# Patient Record
Sex: FEMALE | Race: WHITE
[De-identification: names, ages, dates, MRNs, and addresses within clinical notes are randomized per-mention and may not be internally consistent; named-entity substitution may affect disease eponyms.]

---

## 2022-05-27 ENCOUNTER — HOSPITAL ENCOUNTER (EMERGENCY)
Dept: HOSPITAL 97 - ER | Age: 22
Discharge: HOME | End: 2022-05-27
Payer: SELF-PAY

## 2022-05-27 VITALS — SYSTOLIC BLOOD PRESSURE: 102 MMHG | DIASTOLIC BLOOD PRESSURE: 62 MMHG | OXYGEN SATURATION: 100 %

## 2022-05-27 VITALS — TEMPERATURE: 98.2 F

## 2022-05-27 DIAGNOSIS — Z32.02: ICD-10-CM

## 2022-05-27 DIAGNOSIS — Z88.0: ICD-10-CM

## 2022-05-27 DIAGNOSIS — L02.414: Primary | ICD-10-CM

## 2022-05-27 LAB — SP GR UR: 1.02 (ref 1–1.03)

## 2022-05-27 PROCEDURE — 81003 URINALYSIS AUTO W/O SCOPE: CPT

## 2022-05-27 PROCEDURE — 99283 EMERGENCY DEPT VISIT LOW MDM: CPT

## 2022-05-27 PROCEDURE — 81025 URINE PREGNANCY TEST: CPT

## 2022-05-27 PROCEDURE — 0H9EXZZ DRAINAGE OF LEFT LOWER ARM SKIN, EXTERNAL APPROACH: ICD-10-PCS

## 2022-05-27 NOTE — XMS REPORT
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









             COVID 19 Asymptomatic IH AG NEGATIVE     NEGATIVE                  

Negative results should be 

treated



             (test code = COVNONPUIAG)                                        as

 presumptive and ifinconsistent



                                                                 with clinical s

igns and symptoms, or



                                                                 necessaryfor pa

tient management,



                                                                 should be teste

d with an



                                                                 alternativemole

cular assay. Negative



                                                                 results do not 

preclude



                                                                 SARS-CoV-2infec

tion and should not



                                                                 be used as the 

sole basis forpatient



                                                                 management deci

sions. Negative



                                                                 results should 

beconsidered in the



                                                                 context of a pa

tient's recent



                                                                 exposures,histo

ry, presence of



                                                                 clinical signs 

and symptoms



                                                                 consistentwith 

COVID-19.



URINALYSIS [id number] 17:30:00





             Test Item    Value        Reference Range Interpretation Comments

 

             UA COLOR (test code = COLU) YELLOW                                 

 

             UA APPEARANCE (test code = SLHZY                                  



             APPU)                                               

 

             UA GLUCOSE DIPSTICK (test NORMAL mg/dl NORMAL                    



             code = DGLUU)                                        

 

             UA BILIRUBIN DIPSTICK (test NEGATIVE mg/dL NEGATIVE                

  



             code = BILU)                                        

 

             UA KETONE DIPSTICK (test NEGATIVE mg/dl NEGATIVE                  



             code = KETU)                                        

 

             UA SPECIFIC GRAVITY (test 1.010        1.000-1.030               



             code = SGU)                                         

 

             UA BLOOD DIPSTICK (test NEGATIVE Jason/micL NEGATIVE                 

 



             code = KRISS)                                         

 

             UA PH DIPSTICK (test code = 7.0          5.0-9.0                   



             FAZAL)                                                

 

             UA PROTEIN DIPSTICK (test NEGATIVE mg/dl NEGATIVE                  



             code = PROU)                                        

 

             UA UROBILINIOGEN DIPSTICK NORMAL mg/dl NORMAL                    



             (test code = URO)                                        

 

             UA NITRITE DIPSTICK (test NEGATIVE     NEGATIVE                  



             code = LAUREN)                                        

 

             UA LEUKOCYTE ESTERASE NEGATIVE Flaquita/micL NEGATIVE                  



             DIPSTICK (test code = LEUU)                                        

 

             UA WBC (test code = WBCU) 0-3 WBC/HPF  NONE                      

 

             UA RBC (test code = RBCU) 0-2 RBC/HPF  0-3                       

 

             UA EPITHELIAL CELLS (test 5-10 EPI/HPF 0-3          A            



             code = EPIU)                                        

 

             UA BACTERIA (test code = MANY         NONE         A            



             BACU)                                               

 

             UA RENAL CELLS (test code = FEW                                    



             NAOMY)                                               

 

             UA AMORPHOUS SEDIMENT (test MANY         NONE                      



             code = AMORU)                                        



UR HCG [id number] 17:30:00





             Test Item    Value        Reference Range Interpretation Comments

 

             UR HCG QUAL (test code = HCGQLU) NEGATIVE     NEGATIVE             

     



XR KNEE &lt;3 VW [id number] 21:01:24 No acute bony abnormality. Preliminary 
Report Dictated by Resident: Ricardo Escobar MD., have reviewed 
this study and agree with the abovereport.EXAM: XR KNEE &lt;3 VW LEFT HISTORY: 
instability pain to lateral  COMPARISON: None. FINDINGS:  Radiographs of the 
left knee demonstrate no acute fractures ordislocations. Joint spaces are 
preserved. Alignment is within normallimits. The soft tissues are unremarkable. 
[facility], Radiant Results In[facility] User - 2021  4:02 PM CDTEXAM: XR KNEE &lt;3 VW 
LEFTHISTORY: instability pain to lateral COMPARISON: None.FINDINGS: Radiographs 
of the left knee demonstrate no acute fractures ordislocations. Joint spaces are
 preserved. Alignment is within normallimits. The soft tissues are 
unremarkable.IMPRESSIONNo acute bony abnormality.Preliminary Report Dictated by 
Resident: Ricardo Norman MD., have reviewed this study and agree 
with the abovereport.[facility]





             Test Item    Value        Reference Range Interpretation Comments

 

             POCT PREG (test code = 1605) negative                              

 

 

             On board controls acceptable with C present                        

        



             Line (test code = 3574)                                        

 

             Lab Interpretation (test code = Normal                             

    



             51768-7)                                            



[facility] RNA [Presence] in 
Respiratory specimen by NAKITA with probe [id number] 19:08:52





             Test Item    Value        Reference Range Interpretation Comments

 

             SARS-CoV-2 (COVID-19) RNA Not detected Not-Detected              



             [Presence] in Respiratory                                        



             specimen by NAKITA with probe                                        



             detection (test code = 92812-0)                                    

    



Novel Coronavirus  [id number] 08:55:00





             Test Item    Value        Reference Range Interpretation Comments

 

             Novel Coronavirus 2019 Negative     NEGATIVE                  Testi

ng performed



             nCoV (test code =                                        at:[facility]

[facility],



             COVID19)                                            [id number],



                                                                 [address] [place] [address]



                                                                 [id number]

23IA



                                                                 # [id number]



Specimen comments: SwabDoes patient have the clinical criteria consistent with 
COVID-19? YIs the patient going to be discharged home? YURINALYSIS COMPLETE
2020 22:51:00





             Test Item    Value        Reference Range Interpretation Comments

 

             UA COLOR (test code = COLU) LT YELLOW                              

 

             UA APPEARANCE (test code = SLHZY                                  



             APPU)                                               

 

             UA GLUCOSE DIPSTICK (test NORMAL mg/dl NORMAL                    



             code = DGLUU)                                        

 

             UA BILIRUBIN DIPSTICK (test NEGATIVE mg/dL NEGATIVE                

  



             code = BILU)                                        

 

             UA KETONE DIPSTICK (test NEGATIVE mg/dl NEGATIVE                  



             code = KETU)                                        

 

             UA SPECIFIC GRAVITY (test 1.010        1.000-1.030               



             code = SGU)                                         

 

             UA BLOOD DIPSTICK (test NEGATIVE Jason/micL NEGATIVE                 

 



             code = KRISS)                                         

 

             UA PH DIPSTICK (test code = 7.0          5.0-9.0                   



             FAZAL)                                                

 

             UA PROTEIN DIPSTICK (test NEGATIVE mg/dl NEGATIVE                  



             code = PROU)                                        

 

             UA UROBILINIOGEN DIPSTICK NORMAL mg/dl NORMAL                    



             (test code = URO)                                        

 

             UA NITRITE DIPSTICK (test NEGATIVE     NEGATIVE                  



             code = LAUREN)                                        

 

             UA LEUKOCYTE ESTERASE NEGATIVE Flaquita/micL NEGATIVE                  



             DIPSTICK (test code = LEUU)                                        

 

             UA WBC (test code = WBCU) 0-3 WBC/HPF  NONE                      

 

             UA RBC (test code = RBCU) 0-2 RBC/HPF  0-3                       

 

             UA EPITHELIAL CELLS (test 10-15 EPI/HPF 0-3          A            



             code = EPIU)                                        

 

             UA BACTERIA (test code = FEW          NONE                      



             BACU)                                               



UR HCG [id number] 22:51:00





             Test Item    Value        Reference Range Interpretation Comments

 

             UR HCG QUAL (test code = HCGQLU) NEGATIVE     NEGATIVE             

     



URINALYSIS [id number] 22:44:00





             Test Item    Value        Reference Range Interpretation Comments

 

             UA COLOR (test code = COLU) LT YELLOW                              

 

             UA APPEARANCE (test code = SLHZY                                  



             APPU)                                               

 

             UA GLUCOSE DIPSTICK (test NORMAL mg/dl NORMAL                    



             code = DGLUU)                                        

 

             UA BILIRUBIN DIPSTICK (test NEGATIVE mg/dL NEGATIVE                

  



             code = BILU)                                        

 

             UA KETONE DIPSTICK (test NEGATIVE mg/dl NEGATIVE                  



             code = KETU)                                        

 

             UA SPECIFIC GRAVITY (test 1.010        1.000-1.030               



             code = SGU)                                         

 

             UA BLOOD DIPSTICK (test NEGATIVE Jason/micL NEGATIVE                 

 



             code = KRISS)                                         

 

             UA PH DIPSTICK (test code = 7.0          5.0-9.0                   



             FAZAL)                                                

 

             UA PROTEIN DIPSTICK (test NEGATIVE mg/dl NEGATIVE                  



             code = PROU)                                        

 

             UA UROBILINIOGEN DIPSTICK NORMAL mg/dl NORMAL                    



             (test code = URO)                                        

 

             UA NITRITE DIPSTICK (test NEGATIVE     NEGATIVE                  



             code = LAUREN)                                        

 

             UA LEUKOCYTE ESTERASE NEGATIVE Flaquita/micL NEGATIVE                  



             DIPSTICK (test code = LEUU)                                        

 

             UA WBC (test code = WBCU)  WBC/HPF     NONE                      

 

             UA RBC (test code = RBCU)  RBC/HPF     0-3                       

 

             UA EPITHELIAL CELLS (test  EPI/HPF     0-3                       



             code = EPIU)                                        

 

             UA BACTERIA (test code =              NONE                      



             BACU)                                               



UR HCG [id number] 22:44:00





             Test Item    Value        Reference Range Interpretation Comments

 

             UR HCG QUAL (test code = HCGQLU) NEGATIVE     NEGATIVE             

     



URINALYSIS [id number] 22:43:00





             Test Item    Value        Reference Range Interpretation Comments

 

             UA COLOR (test code = COLU)                                        

 

             UA APPEARANCE (test code = APPU)                                   

     

 

             UA GLUCOSE DIPSTICK (test code =  mg/dl       NORMAL               

     



             DGLUU)                                              

 

             UA BILIRUBIN DIPSTICK (test code =  mg/dL       NEGATIVE           

       



             BILU)                                               

 

             UA KETONE DIPSTICK (test code =  mg/dl       NEGATIVE              

    



             KETU)                                               

 

             UA SPECIFIC GRAVITY (test code =              1.000-1.030          

     



             SGU)                                                

 

             UA BLOOD DIPSTICK (test code = KRISS)  Jason/micL    NEGATIVE          

        

 

             UA PH DIPSTICK (test code = FAZAL)              5.0-9.0              

     

 

             UA PROTEIN DIPSTICK (test code =  mg/dl       NEGATIVE             

     



             PROU)                                               

 

             UA UROBILINIOGEN DIPSTICK (test  mg/dl       NORMAL                

    



             code = URO)                                         

 

             UA NITRITE DIPSTICK (test code =              NEGATIVE             

     



             LAUREN)                                               

 

             UA LEUKOCYTE ESTERASE DIPSTICK  Flaquita/micL    NEGATIVE               

   



             (test code = LEUU)                                        

 

             UA WBC (test code = WBCU)  WBC/HPF     NONE                      

 

             UA RBC (test code = RBCU)  RBC/HPF     0-3                       

 

             UA EPITHELIAL CELLS (test code =  EPI/HPF     0-3                  

     



             EPIU)                                               

 

             UA BACTERIA (test code = BACU)              NONE                   

   



UR HCG [id number] 22:43:00





             Test Item    Value        Reference Range Interpretation Comments

 

             UR HCG QUAL (test code = HCGQLU) NEGATIVE     NEGATIVE             

     



- XR CHEST 1 -95-54 21:55:00 FAX:         Shady Long  [phone]
    [place]:   St: PRE-------------------------------
------------------------------------------------  Name:   JAQUI SPARKS     
           [facility]                      : 2000  Age/S: 19/F      
     [address] [facility]    Unit#: [id number]      Loc: [place], [place]                  Phys: Shady Long          [id number]      
                       Acct: [id number] Dis Date:               Status: PRE ER
                                 PHONE #: [phone]     Exam Date:     
2020           FAX #: [phone]     Reason: COUGH             
                                 EXAMS:                                         
     CPT CODE:      [id number] XR CHEST 1 V             03744                    
LOCATION: T18               EXAM: CHEST 1 VIEW               INDICATION: , COUGH
               COMPARISON: None.               TECHNIQUE: AP chest radiograph.  
       FINDINGS:   Lungs are clear bilaterally without effusion. Heart is       
normal in size. Bones and peripheral soft tissues are unremarkable.             
    IMPRESSION: Lungs are clear. No acute abnormality.          ** 
Electronically Signed by KARL Murillo on 2020 at 2155 **             
  Reported and signed by: Micah Murillo M.D.                           CC: Shady DONAHUE                      Technologist: LIS BASILIO            
                        Trnscrd Date/Time/By: 2020 () : By: [email or web address]
           PAGE  1                       Signed Report                          
      FAX:         Shady Long  [phone]    [place]:   St: PRE
------------------------------------------------------------------------------- 
 Name:   GIO SPARKSAL MEERA                [facility]                      
: 2000  Age/S: 19/F            [facility]    Unit #: 
[id number]      Loc: [place]        [place], [place] Phys: Shady Long    
           [address]                             Acct: [id number] Dis Date:       
        Status: PRE ER                                 PHONE #: [phone]    
 Exam Date:    2020                   FAX #: [phone]     
Reason: COUGH                        EXAMS:                                     
          CPT CODE:      [id number] XR CHEST 1 V                               
[address]               &lt;Continued&gt; Orig Print D/T: S:2020 (0121)       
                                                           PAGE  2            
Signed Report- XR HAND 3 + V [id number] 06:02:00 FAX: Shante Ansari MD                   [place]:   St: REG-------------------------------
------------------------------------------------  Name:   CLAREJAQUI     
           [facility]                      : 2000  Age/S: 19/F      
      [facility]    Unit#: [id number]      Loc: [address] [place] [address]                  Phys: Shante Shore MD          [id number]       
                      Acct: [id number] Dis Date:               Status: REG ER 
                                PHONE #: [phone]     Exam Date:     
2020     05           FAX #: [phone]     Reason: INJURY            
                                 EXAMS:                                         
     CPT CODE:      [id number] XR HAND 3 + V LT             [address]                
    EXAM:  - XR HAND 3 + V LT,  - XR HAND 3 + V RT               LOCATION: H57  
             HISTORY: 19 years-year old Female with INJURY               
COMPARISON: None available time of interpretation.               FINDINGS:      
         Frontal, oblique, and lateral views of the bilateral hands are       
provided.               No acute fracture or malalignment.No soft tissue 
findings are       apparent.                 IMPRESSION:                   
Unremarkable radiographs of the bilateral hands.                    ** 
Electronically Signed by Cale Bautista MD on 2020 at 0602 **             
         Reported and signed by: Cale Bautista MD         CC: Shante Shore MD
                                                Technologist: Moraima Jones 
(R)                Trnscrd Date/Time/By: 2020 (602) : By: [email or web address]   
        PAGE  1            Signed Report                                 FAX: Shante Ansari MD       [place]:   St: 
REG---------------------------------------------------------------------------
----  Name:   JAQUI SPARKS                [facility]                    
  : 2000  Age/S: 19/F           [address] [facility]    Unit #:
 [id number]      Loc: [address]                  Phys: 
Shante Shore MD                [id number]: [id number] Dis Date:             
  Status: REG ER                                 PHONE #: [phone]     Exam 
Date:     2020     05                   FAX #: [phone]     Reason: 
INJURY                                             EXAMS:  CPT CODE:      
[id number] XR HAND 3 + V LT                           [address]               
&lt;Continued&gt; Orig Print D/T: S: 2020 (06)        PAGE  2           
            Signed Report- XR HAND 3 + V [id number] 06:02:00 FAX: Shante Ansari MD                   [place]:   St: 
REG-------------------------------
------------------------------------------------  Name:   JAQUI SPARKS     
           [facility]                      : 2000  Age[facility]S: [address] [facility]    Unit#: [id number]      Loc: [address]       
[place], [place]                  Phys: Shante Shore MD          [id number]       
                      Acct: [id number] Dis Date:               Status: REG ER 
                                PHONE #: [phone]     Exam Date:     
2020     0554           FAX #: [phone]     Reason: injury            
                                 EXAMS:                                         
     CPT CODE:      [id number] XR HAND 3 + V RT             [address]                
    EXAM:  - XR HAND 3 + V LT,  - XR HAND 3 + V RT               LOCATION: H57  
             HISTORY: 19 years-year old Female with INJURY               
COMPARISON: None available time of interpretation.               FINDINGS:      
         Frontal, oblique, and lateral views of the bilateral hands are       
provided.               No acute fracture or malalignment.No soft tissue 
findings are       apparent.                 IMPRESSION:                   
Unremarkable radiographs of the bilateral hands.                    ** 
Electronically Signed by Cale Bautista MD on 2020 at 0602 **             
         Reported and signed by: Cale Bautista MD         CC: Shante Shore MD
                                                Technologist: Moraima Jones 
RT(R)                Trnscrd Date/Time/By: 2020 (0602) : By: [email or web address]   
        PAGE  1            Signed Report                                 FAX: Shante Ansari MD       [place]:   St: 
REG---------------------------------------------------------------------------
----  Name:   CLAREJAQUI MEERA                [facility]                    
  : 2000  Age/S: [address] [facility]    Unit #:
 [id number]      Loc: [address]       [place], [place]                  Phys: 
Shante Shore MD                [id number]: [id number] Dis Date:             
  Status: REG ER                                 PHONE #: [phone]     Exam 
Date:     2020     0554                   FAX #: [phone]     Reason: 
injury                                             EXAMS:  CPT CODE:      
[id number] XR HAND 3 + V RT                           [address]38[address]               
&lt;Continued&gt; Orig Print D/T: S: 2020 (605)        PAGE  2           
            Signed ReportUR HCG [id number] 19:17:00





             Test Item    Value        Reference Range Interpretation Comments

 

             UR HCG QUAL (test code = HCGQLU) NEGATIVE     NEGATIVE             

     



URINALYSIS [id number] 19:06:00





             Test Item    Value        Reference Range Interpretation Comments

 

             UA COLOR (test code = YELLOW                                 



             COLU)                                               

 

             UA APPEARANCE (test code SLHZY                                  



             = APPU)                                             

 

             UA GLUCOSE DIPSTICK (test NORMAL mg/dl NORMAL                    



             code = DGLUU)                                        

 

             UA BILIRUBIN DIPSTICK NEGATIVE mg/dL NEGATIVE                  



             (test code = BILU)                                        

 

             UA KETONE DIPSTICK (test NEGATIVE mg/dl NEGATIVE                  



             code = KETU)                                        

 

             UA SPECIFIC GRAVITY (test 1.010        1.000-1.030               



             code = SGU)                                         

 

             UA BLOOD DIPSTICK (test 10 Jason/micL Jason/micL NEGATIVE     A        

    



             code = KRISS)                                         

 

             UA PH DIPSTICK (test code 7.0          5.0-9.0                   



             = FAZAL)                                              

 

             UA PROTEIN DIPSTICK (test NEGATIVE mg/dl NEGATIVE                  



             code = PROU)                                        

 

             UA UROBILINIOGEN DIPSTICK NORMAL mg/dl NORMAL                    



             (test code = URO)                                        

 

             UA NITRITE DIPSTICK (test NEGATIVE     NEGATIVE                  



             code = LAUREN)                                        

 

             UA LEUKOCYTE ESTERASE NEGATIVE [place]/micL NEGATIVE                  



             DIPSTICK (test code =                                        



             LEUU)                                               

 

             UA WBC (test code = WBCU) 1-3/HPF WBC/HPF NONE                     

 

 

             UA RBC (test code = RBCU) 1-3 RBC/HPF  0-3                       

 

             UA EPITHELIAL CELLS (test 2-5 EPI/HPF  0-3          A            



             code = EPIU)                                        

 

             UA BACTERIA (test code = FEW          NONE                      



             BACU)                                               

 

             UA MUCUS (test code = 1+                                     



             MUCU)                                               

 

             UA AMORPHOUS SEDIMENT MOD          NONE                      



             (test code = AMORU)                                        



URINALYSIS [id number] 18:45:00





             Test Item    Value        Reference Range Interpretation Comments

 

             UA COLOR (test code =                                        



             COLU)                                               

 

             UA APPEARANCE (test code                                        



             = APPU)                                             

 

             UA GLUCOSE DIPSTICK (test NORMAL mg/dl NORMAL                    



             code = DGLUU)                                        

 

             UA BILIRUBIN DIPSTICK NEGATIVE mg/dL NEGATIVE                  



             (test code = BILU)                                        

 

             UA KETONE DIPSTICK (test NEGATIVE mg/dl NEGATIVE                  



             code = KETU)                                        

 

             UA SPECIFIC GRAVITY (test 1.010        1.000-1.030               



             code = SGU)                                         

 

             UA BLOOD DIPSTICK (test 10 Jason/micL Jason/micL NEGATIVE     A        

    



             code = KRISS)                                         

 

             UA PH DIPSTICK (test code 7.0          5.0-9.0                   



             = FAZAL)                                              

 

             UA PROTEIN DIPSTICK (test NEGATIVE mg/dl NEGATIVE                  



             code = PROU)                                        

 

             UA UROBILINIOGEN DIPSTICK NORMAL mg/dl NORMAL                    



             (test code = URO)                                        

 

             UA NITRITE DIPSTICK (test NEGATIVE     NEGATIVE                  



             code = LAUREN)                                        

 

             UA LEUKOCYTE ESTERASE NEGATIVE [place]/micL NEGATIVE                  



             DIPSTICK (test code =                                        



             LEUU)                                               

 

             UA WBC (test code = WBCU)  WBC/HPF     NONE                      

 

             UA RBC (test code = RBCU)  RBC/HPF     0-3                       

 

             UA EPITHELIAL CELLS (test  EPI/HPF     0-3                       



             code = EPIU)                                        

 

             UA BACTERIA (test code =              NONE                      



             BACU)

## 2022-05-27 NOTE — EDPHYS
Physician Documentation                                                                           

 [facility]                                                                 

Name: Shaylee Laura                                                                             

Age: 21 yrs                                                                                       

Sex: Female                                                                                       

: 2000                                                                                   

MRN: [id number]                                                                                   

Arrival Date: 2022                                                                          

Time: 16:25                                                                                       

Account#: [id number]                                                                            

Bed 25                                                                                            

Private MD:                                                                                       

ED Physician Roman Montalvo                                                                         

HPI:                                                                                              

                                                                                             

19:00 This 21 yrs old Female presents to ER via Ambulatory with complaints of Lump on Arm.    cp  

19:00 The patient or guardian complains of an abscess, small.                                 cp  

19:00 The complaints affect the dorsal aspect of left forearm. Context: resulted from unknown cp  

      cause. Onset: The symptoms/episode began/occurred 4 day(s) ago. Treatment prior to          

      arrival includes: no previous treatment. Associated signs and symptoms: Pertinent           

      positives: erythema, pain, swelling, warmth, Pertinent negatives: fever. Severity of        

      symptoms: in the emergency department the symptoms are unchanged.                           

                                                                                                  

Historical:                                                                                       

- Allergies:                                                                                      

17:56 PENICILLINS;                                                                            iw  

                                                                                                  

                                                                                                  

                                                                                                  

ROS:                                                                                              

19:05 Constitutional: Negative for body aches, chills, fever, poor PO intake.                 cp  

19:05 Eyes: Negative for injury, pain, redness, and discharge.                                cp  

19:05 Neck: Negative for pain with movement, pain at rest, stiffness.                             

19:05 Respiratory: Negative for shortness of breath, wheezing.                                    

19:05 Abdomen/GI: Negative for abdominal pain, nausea, vomiting, and diarrhea.                    

19:05 Back: Negative for pain at rest, pain with movement.                                        

19:05 Skin: Positive for abscess, erythema, of the volar side left proximal forearm.              

19:05 All other systems are negative.                                                             

                                                                                                  

Exam:                                                                                             

19:10 Constitutional: The patient appears in no acute distress, alert, awake, non-toxic, well cp  

      developed, well nourished, uncomfortable.                                                   

19:10 Head/Face:  Normocephalic, atraumatic.                                                  cp  

19:10 Cardiovascular: Rate: normal, Pulses: Pulses are 2+ in left radial artery.                  

19:10 Respiratory: the patient does not display signs of respiratory distress,  Respirations:     

      normal, no use of accessory muscles, no retractions, labored breathing, is not present.     

19:10 Skin: abscess, that is small, of the volar side proximal left forearm, with induration,     

      mild surround erythema, tender to palpation.                                                

                                                                                                  

Vital Signs:                                                                                      

17:56  / 74; Pulse 82; Resp 16; Temp 98.2; Pulse Ox 100% on R/A;                        iw  

21:13  / 78; Pulse 84; Resp 16 S; Pulse Ox 99% on R/A;                                  bb  

22:21  / 62; Pulse 88; Resp 16 S; Pulse Ox 100% on R/A;                                 bb  

                                                                                                  

Procedures:                                                                                       

22:00 I \T\ D: Incision and drainage was performed for an abscess of the volar side left        cp

      proximal phalanx Prepped with Betadine, Anesthetized with 3 ml's 1% Lidocaine w/ Epi.       

      Incised with #11 blade. Drained small amount purulent fluid. Dressing: sterile 4x4          

      gauze, the patient tolerated the procedure well.                                            

                                                                                                  

MDM:                                                                                              

20:00 Patient medically screened.                                                             cp  

21:00 Differential diagnosis: cellulitis, abscess, boild.                                     cp  

22:04 Data reviewed: vital signs, nurses notes.                                               cp  

22:04 Counseling: I had a detailed discussion with the patient and/or guardian regarding: the cp  

      historical points, exam findings, and any diagnostic results supporting the                 

      discharge/admit diagnosis, to return to the emergency department if symptoms worsen or      

      persist or if there are any questions or concerns that arise at home. Response to           

      treatment: the patient's symptoms have mildly improved after treatment, improved, and       

      as a result, I will discharge patient.                                                      

                                                                                                  

                                                                                             

21:59 Order name: Urine Pregnancy--Ancillary (enter results)                                  2 

                                                                                             

22:00 Order name: Urine Dipstick-Ancillary                                                    [facility]

                                                                                             

18:07 Order name: I\T\D Setup; Complete Time: 21:13                                             

                                                                                             

21:45 Order name: Urine Dipstick-Ancillary (obtain specimen); Complete Time: 21:58              

                                                                                             

21:45 Order name: Urine Pregnancy Test (obtain specimen); Complete Time: 21:58                cp  

                                                                                                  

Administered Medications:                                                                         

20:47 Drug: Lidocaine-Epinephrine -1%: (1:100,000) 5 ml {Note: by Titi DONAHUE to affected   bb  

      area.} Volume: 20 ml; Route: Infiltration;                                                  

22:18 Follow up: Response: No adverse reaction                                                bb  

22:18 Drug: Bactrim (trimethoprim-sulfamethoxazole) (160 mg-800 mg (DS) 1 tablet Route: PO;   bb  

22:22 Follow up: Response: Medication administered at discharge.                              bb  

                                                                                                  

                                                                                                  

Disposition Summary:                                                                              

22 22:04                                                                                    

Discharge Ordered                                                                                 

      Location: Home                                                                          cp  

      Problem: new                                                                            cp  

      Symptoms: have improved                                                                 cp  

      Condition: Stable                                                                       cp  

      Diagnosis                                                                                   

        - Cutaneous abscess of left upper limb                                                cp  

        - Encounter for pregnancy test, result negative                                       cp  

      Followup:                                                                               cp  

        - With: Private Physician                                                                  

        - When: 1 - 2 days                                                                         

        - Reason: Worsening of condition                                                           

      Discharge Instructions:                                                                     

        - Discharge Summary Sheet                                                             cp  

        - Skin Abscess                                                                        cp  

        - Incision and Drainage                                                               cp  

      Forms:                                                                                      

        - Medication Reconciliation Form                                                      cp  

        - Thank You Letter                                                                    cp  

        - Antibiotic Education                                                                cp  

        - Prescription Opioid Use                                                             cp  

      Prescriptions:                                                                              

        - Bactrim -160 mg Oral Tablet                                                        

            - take 1 tablet by ORAL route every 12 hours for 10 days; 20 tablet; Refills: 0,  cp  

      Product Selection Permitted                                                                 

        - Ibuprofen 600 mg Oral Tablet                                                             

            - take 1 tablet by ORAL route every 8 hours As needed take with food; 30 tablet;  cp  

      Refills: 0, Product Selection Permitted                                                     

        - mupirocin 2 % Topical ointment                                                           

            - apply 1 application by TOPICAL route 3 times per day for 14 days; 30 gram;      cp  

      Refills: 0, Product Selection Permitted                                                     

Addendum:                                                                                         

2022                                                                                        

     07:12 Co-signature as Attending Physician, Roman Montalvo MD.                                    r
n

                                                                                                  

Signatures:                                                                                       

Dispatcher [employer]                           Marcelle Lake RN RN bb Williams, Irene, RN RN iw Nieto, Roman, MD MD rn Page, Corey, PA                         PA   cp                                                   

                                                                                                  

**************************************************************************************************

## 2022-05-27 NOTE — ER
Nurse's Notes                                                                                     

 [facility]                                                                 

Name: Shaylee Laura                                                                             

Age: 21 yrs                                                                                       

Sex: Female                                                                                       

: 2000                                                                                   

MRN: [id number]                                                                                   

Arrival Date: 2022                                                                          

Time: 16:25                                                                                       

Account#: [id number]                                                                            

Bed 25                                                                                            

Private MD:                                                                                       

Diagnosis: Cutaneous abscess of left upper limb;Encounter for pregnancy test, result negative     

                                                                                                  

Presentation:                                                                                     

                                                                                             

17:56 Chief complaint: Patient states: abscess to LFA X 4days. Coronavirus screen: At this    iw  

      time, the client does not indicate any symptoms associated with coronavirus-19. Ebola       

      Screen: Patient negative for fever greater than or equal to 101.5 degrees Fahrenheit,       

      and additional compatible Ebola Virus Disease symptoms Patient denies exposure to           

      infectious person. Patient denies travel to an Ebola-affected area in the 21 days           

      before illness onset. No symptoms or risks identified at this time. Initial Sepsis          

      Screen: Does the patient meet any 2 criteria? No. Patient's initial sepsis screen is        

      negative. Does the patient have a suspected source of infection? No. Patient's initial      

      sepsis screen is negative. Risk Assessment: Do you want to hurt yourself or someone         

      else? Patient reports no desire to harm self or others. Onset of symptoms was May 23,       

      2022.                                                                                       

17:56 Method Of Arrival: Ambulatory                                                           iw  

17:56 Acuity: EV 4                                                                           iw  

                                                                                                  

Historical:                                                                                       

- Allergies:                                                                                      

17:56 PENICILLINS;                                                                            iw  

                                                                                                  

                                                                                                  

                                                                                                  

Screenin:13 Abuse screen: Denies threats or abuse. Nutritional screening: No deficits noted.        bb  

      Tuberculosis screening: No symptoms or risk factors identified. Fall Risk None              

      identified.                                                                                 

                                                                                                  

Assessment:                                                                                       

21:13 General: Appears in no apparent distress. slender, Behavior is calm, cooperative. Pain: bb  

      Complains of pain in left arm. Neuro: Level of Consciousness is awake, alert, obeys         

      commands, Oriented to person, place, time, situation. Cardiovascular: Capillary refill      

      < 3 seconds Patient's skin is warm and dry. Respiratory: Respiratory effort is even,        

      unlabored, Respiratory pattern is regular. GI: No signs and/or symptoms were reported       

      involving the gastrointestinal system. Derm: Abscess located on left arm.                   

      Musculoskeletal: Circulation, motion, and sensation intact.                                 

22:20 Reassessment: Patient is alert, oriented x 3, equal unlabored respirations, skin        bb  

      warm/dry/pink. bandage to left forearm in place pt verbalized understanding of and          

      agrees to plan of care discharge instructions given pt ambulated with steady gait to        

      exit.                                                                                       

                                                                                                  

Vital Signs:                                                                                      

17:56  / 74; Pulse 82; Resp 16; Temp 98.2; Pulse Ox 100% on R/A;                        iw  

21:13  / 78; Pulse 84; Resp 16 S; Pulse Ox 99% on R/A;                                  bb  

22:21  / 62; Pulse 88; Resp 16 S; Pulse Ox 100% on R/A;                                 bb  

                                                                                                  

ED Course:                                                                                        

16:25 Patient arrived in ED.                                                                  ja2 

17:43 Titi Murillo PA is PHCP.                                                                cp  

17:43 Roman Montalvo MD is Attending Physician.                                                cp  

17:56 Triage completed.                                                                       iw  

21:13 Marcelle Appiah RN is Primary Nurse.                                                   bb  

21:13 Patient has correct armband on for positive identification. Bed in low position. Call   bb  

      light in reach.                                                                             

22:22 No provider procedures requiring assistance completed. Patient did not have IV access   bb  

      during this emergency room visit.                                                           

                                                                                                  

Administered Medications:                                                                         

20:47 Drug: Lidocaine-Epinephrine -1%: (1:100,000) 5 ml {Note: by Titi DONAHUE to affected   bb  

      area.} Volume: 20 ml; Route: Infiltration;                                                  

22:18 Follow up: Response: No adverse reaction                                                bb  

22:18 Drug: Bactrim (trimethoprim-sulfamethoxazole) (160 mg-800 mg (DS) 1 tablet Route: PO;   bb  

22:22 Follow up: Response: Medication administered at discharge.                              bb  

                                                                                                  

                                                                                                  

Outcome:                                                                                          

22:04 Discharge ordered by MD.                                                                cp  

22:22 Discharged to home ambulatory.                                                          bb  

22:22 Condition: stable                                                                           

22:22 Discharge instructions given to patient, Instructed on discharge instructions, follow       

      up and referral plans. medication usage, wound care, Demonstrated understanding of          

      instructions, follow-up care, medications, wound care, Prescriptions given X 3.             

22:28 Patient left the ED.                                                                    bb  

                                                                                                  

Signatures:                                                                                       

Marcelle Appiah, RN                     RN   Gloria Moore RN RN iw Page, Corey, PA PA cp Alexander, Jessica ja2                                                  

                                                                                                  

**************************************************************************************************